# Patient Record
Sex: MALE | Race: WHITE | NOT HISPANIC OR LATINO | Employment: FULL TIME | URBAN - METROPOLITAN AREA
[De-identification: names, ages, dates, MRNs, and addresses within clinical notes are randomized per-mention and may not be internally consistent; named-entity substitution may affect disease eponyms.]

---

## 2018-10-07 DIAGNOSIS — M16.10 ARTHRITIS, HIP: Primary | ICD-10-CM

## 2018-10-07 RX ORDER — DICLOFENAC SODIUM 75 MG/1
75 TABLET, DELAYED RELEASE ORAL 2 TIMES DAILY PRN
Qty: 60 TABLET | Refills: 2 | Status: SHIPPED | OUTPATIENT
Start: 2018-10-07 | End: 2019-01-06

## 2019-01-06 ENCOUNTER — OFFICE VISIT (OUTPATIENT)
Dept: URGENT CARE | Facility: CLINIC | Age: 59
End: 2019-01-06
Payer: COMMERCIAL

## 2019-01-06 VITALS
OXYGEN SATURATION: 96 % | HEIGHT: 69 IN | WEIGHT: 225 LBS | BODY MASS INDEX: 33.33 KG/M2 | RESPIRATION RATE: 18 BRPM | HEART RATE: 82 BPM | SYSTOLIC BLOOD PRESSURE: 140 MMHG | DIASTOLIC BLOOD PRESSURE: 84 MMHG | TEMPERATURE: 99 F

## 2019-01-06 DIAGNOSIS — J06.9 URI, ACUTE: Primary | ICD-10-CM

## 2019-01-06 PROCEDURE — 96372 THER/PROPH/DIAG INJ SC/IM: CPT | Mod: S$GLB,,, | Performed by: EMERGENCY MEDICINE

## 2019-01-06 PROCEDURE — 99203 OFFICE O/P NEW LOW 30 MIN: CPT | Mod: 25,S$GLB,, | Performed by: PHYSICIAN ASSISTANT

## 2019-01-06 PROCEDURE — 96372 PR INJECTION,THERAP/PROPH/DIAG2ST, IM OR SUBCUT: ICD-10-PCS | Mod: S$GLB,,, | Performed by: EMERGENCY MEDICINE

## 2019-01-06 PROCEDURE — 3008F BODY MASS INDEX DOCD: CPT | Mod: CPTII,S$GLB,, | Performed by: PHYSICIAN ASSISTANT

## 2019-01-06 PROCEDURE — 3008F PR BODY MASS INDEX (BMI) DOCUMENTED: ICD-10-PCS | Mod: CPTII,S$GLB,, | Performed by: PHYSICIAN ASSISTANT

## 2019-01-06 PROCEDURE — 99203 PR OFFICE/OUTPT VISIT, NEW, LEVL III, 30-44 MIN: ICD-10-PCS | Mod: 25,S$GLB,, | Performed by: PHYSICIAN ASSISTANT

## 2019-01-06 RX ORDER — PROMETHAZINE HYDROCHLORIDE AND CODEINE PHOSPHATE 6.25; 1 MG/5ML; MG/5ML
5 SOLUTION ORAL EVERY 4 HOURS PRN
Qty: 118 ML | Refills: 0 | Status: SHIPPED | OUTPATIENT
Start: 2019-01-06 | End: 2023-09-11

## 2019-01-06 RX ORDER — PSEUDOEPHEDRINE HCL 120 MG/1
120 TABLET, FILM COATED, EXTENDED RELEASE ORAL 2 TIMES DAILY PRN
Qty: 20 TABLET | Refills: 0 | Status: SHIPPED | OUTPATIENT
Start: 2019-01-06 | End: 2019-01-16

## 2019-01-06 RX ORDER — BETAMETHASONE SODIUM PHOSPHATE AND BETAMETHASONE ACETATE 3; 3 MG/ML; MG/ML
9 INJECTION, SUSPENSION INTRA-ARTICULAR; INTRALESIONAL; INTRAMUSCULAR; SOFT TISSUE
Status: COMPLETED | OUTPATIENT
Start: 2019-01-06 | End: 2019-01-06

## 2019-01-06 RX ORDER — DICLOFENAC SODIUM 75 MG/1
75 TABLET, DELAYED RELEASE ORAL 2 TIMES DAILY
COMMUNITY

## 2019-01-06 RX ORDER — PROMETHAZINE HYDROCHLORIDE AND CODEINE PHOSPHATE 6.25; 1 MG/5ML; MG/5ML
5 SOLUTION ORAL EVERY 4 HOURS PRN
Qty: 118 ML | Refills: 0 | Status: SHIPPED | OUTPATIENT
Start: 2019-01-06 | End: 2019-01-06 | Stop reason: SDUPTHER

## 2019-01-06 RX ADMIN — BETAMETHASONE SODIUM PHOSPHATE AND BETAMETHASONE ACETATE 9 MG: 3; 3 INJECTION, SUSPENSION INTRA-ARTICULAR; INTRALESIONAL; INTRAMUSCULAR; SOFT TISSUE at 12:01

## 2019-01-06 NOTE — PATIENT INSTRUCTIONS
Viral Upper Respiratory Illness (Adult)  You have a viral upper respiratory illness (URI), which is another term for the common cold. This illness is contagious during the first few days. It is spread through the air by coughing and sneezing. It may also be spread by direct contact (touching the sick person and then touching your own eyes, nose, or mouth). Frequent handwashing will decrease risk of spread. Most viral illnesses go away within 7 to 10 days with rest and simple home remedies. Sometimes the illness may last for several weeks. Antibiotics will not kill a virus, and they are generally not prescribed for this condition.    Home care  · If symptoms are severe, rest at home for the first 2 to 3 days. When you resume activity, don't let yourself get too tired.  · Avoid being exposed to cigarette smoke (yours or others).  · You may use acetaminophen or ibuprofen to control pain and fever, unless another medicine was prescribed. (Note: If you have chronic liver or kidney disease, have ever had a stomach ulcer or gastrointestinal bleeding, or are taking blood-thinning medicines, talk with your healthcare provider before using these medicines.) Aspirin should never be given to anyone under 18 years of age who is ill with a viral infection or fever. It may cause severe liver or brain damage.  · Your appetite may be poor, so a light diet is fine. Avoid dehydration by drinking 6 to 8 glasses of fluids per day (water, soft drinks, juices, tea, or soup). Extra fluids will help loosen secretions in the nose and lungs.  · Over-the-counter cold medicines will not shorten the length of time youre sick, but they may be helpful for the following symptoms: cough, sore throat, and nasal and sinus congestion. (Note: Do not use decongestants if you have high blood pressure.)  Follow-up care  Follow up with your healthcare provider, or as advised.  When to seek medical advice  Call your healthcare provider right away if any  of these occur:  · Cough with lots of colored sputum (mucus)  · Severe headache; face, neck, or ear pain  · Difficulty swallowing due to throat pain  · Fever of 100.4°F (38°C)  Call 911, or get immediate medical care  Call emergency services right away if any of these occur:  · Chest pain, shortness of breath, wheezing, or difficulty breathing  · Coughing up blood  · Inability to swallow due to throat pain  Date Last Reviewed: 9/13/2015  © 2597-8051 [employer]. [address] [place] [address]. All rights reserved. This information is not intended as a substitute for professional medical care. Always follow your healthcare professional's instructions.

## 2019-01-06 NOTE — PROGRESS NOTES
Subjective:       Patient ID: Fabio Bhardwaj is a 58 y.o. male.    Chief Complaint: Sinus Problem    HPI  Review of Systems    Objective:      Physical Exam    Assessment:       No diagnosis found.    Plan:       ***

## 2019-01-06 NOTE — PROGRESS NOTES
"Subjective:       Patient ID: Fabio Bhardwaj is a 58 y.o. male.    Vitals:  height is 5' 8.5" (1.74 m) and weight is 102.1 kg (225 lb). His tympanic temperature is 98.7 °F (37.1 °C). His blood pressure is 140/84 (abnormal) and his pulse is 82. His respiration is 18 and oxygen saturation is 96%.     Chief Complaint: Sinus Problem    This is a 58 y.o. male who presents today with a chief complaint of   Sinus congestion, cough with yellow mucus, low fever, sinus pressure and some slight ear pain.  Patient states his wife was recently diagnosed with croup.      Sinus Problem   This is a new problem. The current episode started in the past 7 days. The problem has been gradually worsening since onset. The maximum temperature recorded prior to his arrival was 100.4 - 100.9 F. The fever has been present for less than 1 day. He is experiencing no pain. Associated symptoms include congestion, coughing, ear pain, sinus pressure and a sore throat. Pertinent negatives include no chills, diaphoresis, headaches or shortness of breath. Treatments tried: nyquil, gargles. The treatment provided mild relief.       Constitution: Positive for fatigue and fever. Negative for chills and sweating.   HENT: Positive for ear pain, congestion, sinus pressure and sore throat. Negative for sinus pain and voice change.    Neck: Negative for painful lymph nodes.   Cardiovascular: Negative for chest pain and palpitations.   Eyes: Negative for eye redness.   Respiratory: Positive for cough, sputum production and wheezing. Negative for chest tightness, bloody sputum, COPD, shortness of breath, stridor and asthma.    Gastrointestinal: Negative for abdominal pain, nausea, vomiting and diarrhea.   Musculoskeletal: Negative for pain, trauma, joint pain and muscle ache.   Skin: Negative for color change and rash.   Allergic/Immunologic: Negative for seasonal allergies and asthma.   Neurological: Negative for dizziness and headaches. "   Hematologic/Lymphatic: Negative for swollen lymph nodes.       Objective:      Physical Exam   Constitutional: He is oriented to person, place, and time. He appears well-developed and well-nourished. He is cooperative.  Non-toxic appearance. He does not appear ill. No distress.   HENT:   Head: Normocephalic and atraumatic.   Right Ear: Hearing, tympanic membrane, external ear and ear canal normal.   Left Ear: Hearing, tympanic membrane, external ear and ear canal normal.   Nose: Mucosal edema present. No rhinorrhea or nasal deformity. No epistaxis. Right sinus exhibits no maxillary sinus tenderness and no frontal sinus tenderness. Left sinus exhibits no maxillary sinus tenderness and no frontal sinus tenderness.   Mouth/Throat: Uvula is midline, oropharynx is clear and moist and mucous membranes are normal. No trismus in the jaw. Normal dentition. No uvula swelling. No posterior oropharyngeal erythema.   Eyes: Conjunctivae and lids are normal. Right eye exhibits no discharge. Left eye exhibits no discharge. No scleral icterus.   Sclera clear bilat   Neck: Trachea normal, normal range of motion, full passive range of motion without pain and phonation normal. Neck supple.   Cardiovascular: Normal rate, regular rhythm, normal heart sounds, intact distal pulses and normal pulses.   Pulmonary/Chest: Effort normal and breath sounds normal. No respiratory distress.   Abdominal: Soft. Normal appearance and bowel sounds are normal. He exhibits no distension, no pulsatile midline mass and no mass. There is no tenderness.   Musculoskeletal: Normal range of motion. He exhibits no edema or deformity.   Neurological: He is alert and oriented to person, place, and time. He exhibits normal muscle tone. Coordination normal.   Skin: Skin is warm, dry and intact. He is not diaphoretic. No pallor.   Psychiatric: He has a normal mood and affect. His speech is normal and behavior is normal. Judgment and thought content normal.  Cognition and memory are normal.   Nursing note and vitals reviewed.      Assessment:       1. URI, acute        Plan:         URI, acute  -     betamethasone acetate-betamethasone sodium phosphate injection 9 mg  -     pseudoephedrine (SUDAFED) 120 mg 12 hr tablet; Take 1 tablet (120 mg total) by mouth 2 (two) times daily as needed for Congestion.  Dispense: 20 tablet; Refill: 0  -     promethazine-codeine 6.25-10 mg/5 ml (PHENERGAN WITH CODEINE) 6.25-10 mg/5 mL syrup; Take 5 mLs by mouth every 4 (four) hours as needed for Cough.  Dispense: 118 mL; Refill: 0      Patient Instructions     Viral Upper Respiratory Illness (Adult)  You have a viral upper respiratory illness (URI), which is another term for the common cold. This illness is contagious during the first few days. It is spread through the air by coughing and sneezing. It may also be spread by direct contact (touching the sick person and then touching your own eyes, nose, or mouth). Frequent handwashing will decrease risk of spread. Most viral illnesses go away within 7 to 10 days with rest and simple home remedies. Sometimes the illness may last for several weeks. Antibiotics will not kill a virus, and they are generally not prescribed for this condition.    Home care  · If symptoms are severe, rest at home for the first 2 to 3 days. When you resume activity, don't let yourself get too tired.  · Avoid being exposed to cigarette smoke (yours or others).  · You may use acetaminophen or ibuprofen to control pain and fever, unless another medicine was prescribed. (Note: If you have chronic liver or kidney disease, have ever had a stomach ulcer or gastrointestinal bleeding, or are taking blood-thinning medicines, talk with your healthcare provider before using these medicines.) Aspirin should never be given to anyone under 18 years of age who is ill with a viral infection or fever. It may cause severe liver or brain damage.  · Your appetite may be poor, so a  light diet is fine. Avoid dehydration by drinking 6 to 8 glasses of fluids per day (water, soft drinks, juices, tea, or soup). Extra fluids will help loosen secretions in the nose and lungs.  · Over-the-counter cold medicines will not shorten the length of time youre sick, but they may be helpful for the following symptoms: cough, sore throat, and nasal and sinus congestion. (Note: Do not use decongestants if you have high blood pressure.)  Follow-up care  Follow up with your healthcare provider, or as advised.  When to seek medical advice  Call your healthcare provider right away if any of these occur:  · Cough with lots of colored sputum (mucus)  · Severe headache; face, neck, or ear pain  · Difficulty swallowing due to throat pain  · Fever of 100.4°F (38°C)  Call 911, or get immediate medical care  Call emergency services right away if any of these occur:  · Chest pain, shortness of breath, wheezing, or difficulty breathing  · Coughing up blood  · Inability to swallow due to throat pain  Date Last Reviewed: 9/13/2015  © 7467-6174 [employer]. [address] [place] [address]. All rights reserved. This information is not intended as a substitute for professional medical care. Always follow your healthcare professional's instructions.

## 2021-03-08 ENCOUNTER — OFFICE VISIT (OUTPATIENT)
Dept: URGENT CARE | Facility: CLINIC | Age: 61
End: 2021-03-08
Payer: COMMERCIAL

## 2021-03-08 VITALS
WEIGHT: 240 LBS | SYSTOLIC BLOOD PRESSURE: 167 MMHG | OXYGEN SATURATION: 98 % | HEART RATE: 72 BPM | DIASTOLIC BLOOD PRESSURE: 91 MMHG | TEMPERATURE: 97 F | HEIGHT: 69 IN | BODY MASS INDEX: 35.55 KG/M2

## 2021-03-08 DIAGNOSIS — G47.30 SLEEP APNEA, UNSPECIFIED TYPE: ICD-10-CM

## 2021-03-08 DIAGNOSIS — R06.02 SOB (SHORTNESS OF BREATH): Primary | ICD-10-CM

## 2021-03-08 DIAGNOSIS — I51.7 CARDIOMEGALY: ICD-10-CM

## 2021-03-08 LAB
CTP QC/QA: YES
SARS-COV-2 RDRP RESP QL NAA+PROBE: NEGATIVE

## 2021-03-08 PROCEDURE — 3008F BODY MASS INDEX DOCD: CPT | Mod: CPTII,S$GLB,, | Performed by: PHYSICIAN ASSISTANT

## 2021-03-08 PROCEDURE — U0002: ICD-10-PCS | Mod: QW,S$GLB,, | Performed by: PHYSICIAN ASSISTANT

## 2021-03-08 PROCEDURE — 99214 PR OFFICE/OUTPT VISIT, EST, LEVL IV, 30-39 MIN: ICD-10-PCS | Mod: S$GLB,,, | Performed by: PHYSICIAN ASSISTANT

## 2021-03-08 PROCEDURE — 93005 EKG 12-LEAD: ICD-10-PCS | Mod: S$GLB,,, | Performed by: PHYSICIAN ASSISTANT

## 2021-03-08 PROCEDURE — 3008F PR BODY MASS INDEX (BMI) DOCUMENTED: ICD-10-PCS | Mod: CPTII,S$GLB,, | Performed by: PHYSICIAN ASSISTANT

## 2021-03-08 PROCEDURE — 93010 ELECTROCARDIOGRAM REPORT: CPT | Mod: S$GLB,,, | Performed by: INTERNAL MEDICINE

## 2021-03-08 PROCEDURE — 71046 XR CHEST PA AND LATERAL: ICD-10-PCS | Mod: S$GLB,,, | Performed by: RADIOLOGY

## 2021-03-08 PROCEDURE — 93005 ELECTROCARDIOGRAM TRACING: CPT | Mod: S$GLB,,, | Performed by: PHYSICIAN ASSISTANT

## 2021-03-08 PROCEDURE — 71046 X-RAY EXAM CHEST 2 VIEWS: CPT | Mod: S$GLB,,, | Performed by: RADIOLOGY

## 2021-03-08 PROCEDURE — U0002 COVID-19 LAB TEST NON-CDC: HCPCS | Mod: QW,S$GLB,, | Performed by: PHYSICIAN ASSISTANT

## 2021-03-08 PROCEDURE — 99214 OFFICE O/P EST MOD 30 MIN: CPT | Mod: S$GLB,,, | Performed by: PHYSICIAN ASSISTANT

## 2021-03-08 PROCEDURE — 93010 EKG 12-LEAD: ICD-10-PCS | Mod: S$GLB,,, | Performed by: INTERNAL MEDICINE

## 2021-03-08 RX ORDER — AZELASTINE 1 MG/ML
1 SPRAY, METERED NASAL 2 TIMES DAILY
Qty: 30 ML | Refills: 0 | Status: SHIPPED | OUTPATIENT
Start: 2021-03-08 | End: 2022-03-08

## 2021-03-09 ENCOUNTER — TELEPHONE (OUTPATIENT)
Dept: URGENT CARE | Facility: CLINIC | Age: 61
End: 2021-03-09

## 2021-03-09 ENCOUNTER — TELEPHONE (OUTPATIENT)
Dept: CARDIOLOGY | Facility: CLINIC | Age: 61
End: 2021-03-09

## 2021-03-09 ENCOUNTER — TELEPHONE (OUTPATIENT)
Dept: ADMINISTRATIVE | Facility: OTHER | Age: 61
End: 2021-03-09

## 2021-03-10 ENCOUNTER — OFFICE VISIT (OUTPATIENT)
Dept: CARDIOLOGY | Facility: CLINIC | Age: 61
End: 2021-03-10
Payer: COMMERCIAL

## 2021-03-10 VITALS
SYSTOLIC BLOOD PRESSURE: 159 MMHG | HEART RATE: 60 BPM | BODY MASS INDEX: 35.15 KG/M2 | DIASTOLIC BLOOD PRESSURE: 78 MMHG | OXYGEN SATURATION: 97 % | HEIGHT: 69 IN | WEIGHT: 237.31 LBS

## 2021-03-10 DIAGNOSIS — I10 ESSENTIAL HYPERTENSION: ICD-10-CM

## 2021-03-10 DIAGNOSIS — R94.31 ABNORMAL ELECTROCARDIOGRAM (ECG) (EKG): ICD-10-CM

## 2021-03-10 DIAGNOSIS — R06.02 SOB (SHORTNESS OF BREATH): Primary | ICD-10-CM

## 2021-03-10 DIAGNOSIS — G47.30 SLEEP APNEA IN ADULT: ICD-10-CM

## 2021-03-10 PROCEDURE — 3078F PR MOST RECENT DIASTOLIC BLOOD PRESSURE < 80 MM HG: ICD-10-PCS | Mod: CPTII,S$GLB,, | Performed by: INTERNAL MEDICINE

## 2021-03-10 PROCEDURE — 3008F BODY MASS INDEX DOCD: CPT | Mod: CPTII,S$GLB,, | Performed by: INTERNAL MEDICINE

## 2021-03-10 PROCEDURE — 99999 PR PBB SHADOW E&M-EST. PATIENT-LVL III: CPT | Mod: PBBFAC,,, | Performed by: INTERNAL MEDICINE

## 2021-03-10 PROCEDURE — 3008F PR BODY MASS INDEX (BMI) DOCUMENTED: ICD-10-PCS | Mod: CPTII,S$GLB,, | Performed by: INTERNAL MEDICINE

## 2021-03-10 PROCEDURE — 99203 PR OFFICE/OUTPT VISIT, NEW, LEVL III, 30-44 MIN: ICD-10-PCS | Mod: S$GLB,,, | Performed by: INTERNAL MEDICINE

## 2021-03-10 PROCEDURE — 93000 EKG 12-LEAD: ICD-10-PCS | Mod: S$GLB,,, | Performed by: INTERNAL MEDICINE

## 2021-03-10 PROCEDURE — 1126F AMNT PAIN NOTED NONE PRSNT: CPT | Mod: S$GLB,,, | Performed by: INTERNAL MEDICINE

## 2021-03-10 PROCEDURE — 3077F PR MOST RECENT SYSTOLIC BLOOD PRESSURE >= 140 MM HG: ICD-10-PCS | Mod: CPTII,S$GLB,, | Performed by: INTERNAL MEDICINE

## 2021-03-10 PROCEDURE — 99999 PR PBB SHADOW E&M-EST. PATIENT-LVL III: ICD-10-PCS | Mod: PBBFAC,,, | Performed by: INTERNAL MEDICINE

## 2021-03-10 PROCEDURE — 1126F PR PAIN SEVERITY QUANTIFIED, NO PAIN PRESENT: ICD-10-PCS | Mod: S$GLB,,, | Performed by: INTERNAL MEDICINE

## 2021-03-10 PROCEDURE — 3078F DIAST BP <80 MM HG: CPT | Mod: CPTII,S$GLB,, | Performed by: INTERNAL MEDICINE

## 2021-03-10 PROCEDURE — 99203 OFFICE O/P NEW LOW 30 MIN: CPT | Mod: S$GLB,,, | Performed by: INTERNAL MEDICINE

## 2021-03-10 PROCEDURE — 93000 ELECTROCARDIOGRAM COMPLETE: CPT | Mod: S$GLB,,, | Performed by: INTERNAL MEDICINE

## 2021-03-10 PROCEDURE — 3077F SYST BP >= 140 MM HG: CPT | Mod: CPTII,S$GLB,, | Performed by: INTERNAL MEDICINE

## 2021-03-10 RX ORDER — AMLODIPINE BESYLATE 5 MG/1
5 TABLET ORAL DAILY
Qty: 5 TABLET | Refills: 4 | Status: SHIPPED | OUTPATIENT
Start: 2021-03-10 | End: 2021-05-18

## 2021-03-11 DIAGNOSIS — I10 ESSENTIAL HYPERTENSION: Primary | ICD-10-CM

## 2021-03-11 PROBLEM — G47.30 SLEEP APNEA IN ADULT: Status: ACTIVE | Noted: 2021-03-11

## 2021-03-11 PROBLEM — R94.31 ABNORMAL ELECTROCARDIOGRAM (ECG) (EKG): Status: ACTIVE | Noted: 2021-03-11

## 2021-03-11 PROBLEM — R06.02 SOB (SHORTNESS OF BREATH): Status: ACTIVE | Noted: 2021-03-11

## 2021-03-11 RX ORDER — AMLODIPINE BESYLATE 5 MG/1
5 TABLET ORAL DAILY
Qty: 90 TABLET | Refills: 4 | Status: SHIPPED | OUTPATIENT
Start: 2021-03-11 | End: 2021-05-18

## 2021-03-18 ENCOUNTER — HOSPITAL ENCOUNTER (OUTPATIENT)
Dept: CARDIOLOGY | Facility: HOSPITAL | Age: 61
Discharge: HOME | End: 2021-03-18
Attending: INTERNAL MEDICINE
Payer: COMMERCIAL

## 2021-03-18 VITALS — HEIGHT: 69 IN | BODY MASS INDEX: 35.1 KG/M2 | WEIGHT: 237 LBS

## 2021-03-18 DIAGNOSIS — R06.02 SOB (SHORTNESS OF BREATH): ICD-10-CM

## 2021-03-18 LAB
AORTIC ROOT ANNULUS: 3.55 CM
AV INDEX (PROSTH): 0.68
AV MEAN GRADIENT: 6 MMHG
AV PEAK GRADIENT: 10 MMHG
AV VALVE AREA: 3.88 CM2
AV VELOCITY RATIO: 0.79
BSA FOR ECHO PROCEDURE: 2.29 M2
CV ECHO LV RWT: 0.69 CM
DOP CALC AO PEAK VEL: 1.55 M/S
DOP CALC AO VTI: 33.37 CM
DOP CALC LVOT AREA: 5.7 CM2
DOP CALC LVOT DIAMETER: 2.7 CM
DOP CALC LVOT PEAK VEL: 1.22 M/S
DOP CALC LVOT STROKE VOLUME: 129.5 CM3
DOP CALCLVOT PEAK VEL VTI: 22.63 CM
E WAVE DECELERATION TIME: 177.91 MSEC
E/A RATIO: 0.82
E/E' RATIO: 7.33 M/S
ECHO LV POSTERIOR WALL: 1.1 CM (ref 0.6–1.1)
FRACTIONAL SHORTENING: 0 % (ref 28–44)
INTERVENTRICULAR SEPTUM: 2.2 CM (ref 0.6–1.1)
LA MAJOR: 5.68 CM
LA MINOR: 5.8 CM
LA WIDTH: 6.38 CM
LEFT ATRIUM SIZE: 4.77 CM
LEFT ATRIUM VOLUME INDEX MOD: 43.2 ML/M2
LEFT ATRIUM VOLUME INDEX: 66.9 ML/M2
LEFT ATRIUM VOLUME MOD: 95.97 CM3
LEFT ATRIUM VOLUME: 148.46 CM3
LEFT INTERNAL DIMENSION IN SYSTOLE: 3.21 CM (ref 2.1–4)
LEFT VENTRICLE DIASTOLIC VOLUME INDEX: 36.81 ML/M2
LEFT VENTRICLE DIASTOLIC VOLUME: 81.72 ML
LEFT VENTRICLE MASS INDEX: 91 G/M2
LEFT VENTRICLE SYSTOLIC VOLUME INDEX: 18.6 ML/M2
LEFT VENTRICLE SYSTOLIC VOLUME: 41.37 ML
LEFT VENTRICULAR INTERNAL DIMENSION IN DIASTOLE: 3.2 CM (ref 3.5–6)
LEFT VENTRICULAR MASS: 201.83 G
LV LATERAL E/E' RATIO: 5 M/S
LV SEPTAL E/E' RATIO: 13.75 M/S
MV MEAN GRADIENT: 0 MMHG
MV PEAK A VEL: 0.67 M/S
MV PEAK E VEL: 0.55 M/S
MV PEAK GRADIENT: 2 MMHG
MV STENOSIS PRESSURE HALF TIME: 51.59 MS
MV VALVE AREA P 1/2 METHOD: 4.26 CM2
PISA TR MAX VEL: 2.11 M/S
PV PEAK VELOCITY: 1.19 CM/S
RA MAJOR: 4.32 CM
RA PRESSURE: 3 MMHG
RA WIDTH: 4.36 CM
RV TISSUE DOPPLER FREE WALL SYSTOLIC VELOCITY 1 (APICAL 4 CHAMBER VIEW): 16.39 CM/S
STJ: 3.04 CM
TDI LATERAL: 0.11 M/S
TDI SEPTAL: 0.04 M/S
TDI: 0.08 M/S
TR MAX PG: 18 MMHG
TRICUSPID ANNULAR PLANE SYSTOLIC EXCURSION: 3.39 CM
TV REST PULMONARY ARTERY PRESSURE: 21 MMHG

## 2021-03-18 PROCEDURE — 93306 ECHO (CUPID ONLY): ICD-10-PCS | Mod: 26,,, | Performed by: INTERNAL MEDICINE

## 2021-03-18 PROCEDURE — 93306 TTE W/DOPPLER COMPLETE: CPT | Mod: 26,,, | Performed by: INTERNAL MEDICINE

## 2021-03-18 PROCEDURE — 93306 TTE W/DOPPLER COMPLETE: CPT

## 2021-03-29 ENCOUNTER — TELEPHONE (OUTPATIENT)
Dept: CARDIOLOGY | Facility: CLINIC | Age: 61
End: 2021-03-29

## 2021-03-29 DIAGNOSIS — I10 ESSENTIAL HYPERTENSION: Primary | ICD-10-CM

## 2021-03-29 DIAGNOSIS — I42.1 MILD HOCM (HYPERTROPHIC OBSTRUCTIVE CARDIOMYOPATHY): ICD-10-CM

## 2021-04-15 ENCOUNTER — HOSPITAL ENCOUNTER (OUTPATIENT)
Dept: CARDIOLOGY | Facility: HOSPITAL | Age: 61
Discharge: HOME | End: 2021-04-15
Attending: INTERNAL MEDICINE
Payer: COMMERCIAL

## 2021-04-15 VITALS — BODY MASS INDEX: 35.1 KG/M2 | HEIGHT: 69 IN | WEIGHT: 237 LBS

## 2021-04-15 DIAGNOSIS — I42.1 MILD HOCM (HYPERTROPHIC OBSTRUCTIVE CARDIOMYOPATHY): ICD-10-CM

## 2021-04-15 DIAGNOSIS — I10 ESSENTIAL HYPERTENSION: ICD-10-CM

## 2021-04-15 LAB
BSA FOR ECHO PROCEDURE: 2.29 M2
CV STRESS BASE HR: 70 BPM
DIASTOLIC BLOOD PRESSURE: 73 MMHG
EJECTION FRACTION: 60 %
OHS CV CPX 1 MINUTE RECOVERY HEART RATE: 112 BPM
OHS CV CPX 85 PERCENT MAX PREDICTED HEART RATE MALE: 134
OHS CV CPX ESTIMATED METS: 10
OHS CV CPX MAX PREDICTED HEART RATE: 158
OHS CV CPX PATIENT IS FEMALE: 0
OHS CV CPX PATIENT IS MALE: 1
OHS CV CPX PEAK DIASTOLIC BLOOD PRESSURE: 67 MMHG
OHS CV CPX PEAK HEAR RATE: 141 BPM
OHS CV CPX PEAK RATE PRESSURE PRODUCT: NORMAL
OHS CV CPX PEAK SYSTOLIC BLOOD PRESSURE: 205 MMHG
OHS CV CPX PERCENT MAX PREDICTED HEART RATE ACHIEVED: 89
OHS CV CPX RATE PRESSURE PRODUCT PRESENTING: NORMAL
STRESS ANGINA INDEX: 0
STRESS ECHO POST EXERCISE DUR MIN: 9 MINUTES
STRESS ECHO POST EXERCISE DUR SEC: 0 SECONDS
SYSTOLIC BLOOD PRESSURE: 153 MMHG

## 2021-04-15 PROCEDURE — 93351 STRESS ECHO (CUPID ONLY): ICD-10-PCS | Mod: 26,,, | Performed by: INTERNAL MEDICINE

## 2021-04-15 PROCEDURE — 93351 STRESS TTE COMPLETE: CPT | Mod: 26,,, | Performed by: INTERNAL MEDICINE

## 2021-04-15 PROCEDURE — 93351 STRESS TTE COMPLETE: CPT

## 2021-04-15 PROCEDURE — 93227 HOLTER MONITOR - 24 HOUR (CUPID ONLY): ICD-10-PCS | Mod: ,,, | Performed by: INTERNAL MEDICINE

## 2021-04-15 PROCEDURE — 93227 XTRNL ECG REC<48 HR R&I: CPT | Mod: ,,, | Performed by: INTERNAL MEDICINE

## 2021-04-15 PROCEDURE — 93226 XTRNL ECG REC<48 HR SCAN A/R: CPT

## 2021-04-16 LAB
OHS CV EVENT MONITOR DAY: 0
OHS CV HOLTER LENGTH DECIMAL HOURS: 24
OHS CV HOLTER LENGTH HOURS: 24
OHS CV HOLTER LENGTH MINUTES: 0

## 2021-05-18 DIAGNOSIS — I10 ESSENTIAL HYPERTENSION: ICD-10-CM

## 2021-05-18 RX ORDER — VALSARTAN 80 MG/1
80 TABLET ORAL DAILY
Qty: 90 TABLET | Refills: 4 | Status: SHIPPED | OUTPATIENT
Start: 2021-05-18 | End: 2022-07-26

## 2021-05-18 RX ORDER — AMLODIPINE BESYLATE 10 MG/1
10 TABLET ORAL DAILY
Qty: 90 TABLET | Refills: 4 | Status: SHIPPED | OUTPATIENT
Start: 2021-05-18 | End: 2022-07-26

## 2021-05-18 RX ORDER — AMLODIPINE BESYLATE 10 MG/1
10 TABLET ORAL DAILY
Qty: 90 TABLET | Refills: 4 | Status: SHIPPED | OUTPATIENT
Start: 2021-05-18 | End: 2021-05-18 | Stop reason: SDUPTHER

## 2021-05-18 RX ORDER — VALSARTAN 80 MG/1
80 TABLET ORAL DAILY
Qty: 90 TABLET | Refills: 4 | Status: SHIPPED | OUTPATIENT
Start: 2021-05-18 | End: 2021-05-18 | Stop reason: SDUPTHER

## 2022-01-28 ENCOUNTER — LAB VISIT (OUTPATIENT)
Dept: LAB | Facility: HOSPITAL | Age: 62
End: 2022-01-28
Payer: COMMERCIAL

## 2022-01-28 ENCOUNTER — TELEPHONE (OUTPATIENT)
Dept: INTERNAL MEDICINE | Facility: CLINIC | Age: 62
End: 2022-01-28
Payer: COMMERCIAL

## 2022-01-28 DIAGNOSIS — R09.1 PLEURISY: Primary | ICD-10-CM

## 2022-01-28 DIAGNOSIS — R09.1 PLEURISY: ICD-10-CM

## 2022-01-28 LAB
BASOPHILS # BLD AUTO: 0.03 K/UL (ref 0–0.2)
BASOPHILS NFR BLD: 0.3 % (ref 0–1.9)
D DIMER PPP IA.FEU-MCNC: 0.99 MG/L FEU
DIFFERENTIAL METHOD: ABNORMAL
EOSINOPHIL # BLD AUTO: 0.1 K/UL (ref 0–0.5)
EOSINOPHIL NFR BLD: 0.9 % (ref 0–8)
ERYTHROCYTE [DISTWIDTH] IN BLOOD BY AUTOMATED COUNT: 13.1 % (ref 11.5–14.5)
ERYTHROCYTE [SEDIMENTATION RATE] IN BLOOD BY WESTERGREN METHOD: 15 MM/HR (ref 0–23)
HCT VFR BLD AUTO: 47.8 % (ref 40–54)
HGB BLD-MCNC: 15.1 G/DL (ref 14–18)
IMM GRANULOCYTES # BLD AUTO: 0.03 K/UL (ref 0–0.04)
IMM GRANULOCYTES NFR BLD AUTO: 0.3 % (ref 0–0.5)
LYMPHOCYTES # BLD AUTO: 2.5 K/UL (ref 1–4.8)
LYMPHOCYTES NFR BLD: 27 % (ref 18–48)
MCH RBC QN AUTO: 28.8 PG (ref 27–31)
MCHC RBC AUTO-ENTMCNC: 31.6 G/DL (ref 32–36)
MCV RBC AUTO: 91 FL (ref 82–98)
MONOCYTES # BLD AUTO: 0.6 K/UL (ref 0.3–1)
MONOCYTES NFR BLD: 6.9 % (ref 4–15)
NEUTROPHILS # BLD AUTO: 5.9 K/UL (ref 1.8–7.7)
NEUTROPHILS NFR BLD: 64.6 % (ref 38–73)
NRBC BLD-RTO: 0 /100 WBC
PLATELET # BLD AUTO: 235 K/UL (ref 150–450)
PMV BLD AUTO: 10.7 FL (ref 9.2–12.9)
RBC # BLD AUTO: 5.24 M/UL (ref 4.6–6.2)
TROPONIN I SERPL DL<=0.01 NG/ML-MCNC: 0.09 NG/ML (ref 0–0.03)
WBC # BLD AUTO: 9.11 K/UL (ref 3.9–12.7)

## 2022-01-28 PROCEDURE — 85025 COMPLETE CBC W/AUTO DIFF WBC: CPT | Performed by: INTERNAL MEDICINE

## 2022-01-28 PROCEDURE — 36415 COLL VENOUS BLD VENIPUNCTURE: CPT | Performed by: INTERNAL MEDICINE

## 2022-01-28 PROCEDURE — 84484 ASSAY OF TROPONIN QUANT: CPT | Performed by: INTERNAL MEDICINE

## 2022-01-28 PROCEDURE — 85379 FIBRIN DEGRADATION QUANT: CPT | Performed by: INTERNAL MEDICINE

## 2022-01-28 PROCEDURE — 85652 RBC SED RATE AUTOMATED: CPT | Performed by: INTERNAL MEDICINE

## 2022-01-28 NOTE — TELEPHONE ENCOUNTER
Dr isabel called me with orders , entered and patient going this evening at 530 at primary care location.    fyi

## 2022-01-29 ENCOUNTER — PATIENT MESSAGE (OUTPATIENT)
Dept: INTERNAL MEDICINE | Facility: CLINIC | Age: 62
End: 2022-01-29
Payer: COMMERCIAL

## 2022-01-29 ENCOUNTER — TELEPHONE (OUTPATIENT)
Dept: INTERNAL MEDICINE | Facility: CLINIC | Age: 62
End: 2022-01-29
Payer: COMMERCIAL

## 2022-01-29 ENCOUNTER — HOSPITAL ENCOUNTER (OUTPATIENT)
Facility: HOSPITAL | Age: 62
Discharge: LEFT BEFORE DISCHARGE | End: 2022-01-30
Attending: EMERGENCY MEDICINE | Admitting: EMERGENCY MEDICINE
Payer: COMMERCIAL

## 2022-01-29 VITALS
DIASTOLIC BLOOD PRESSURE: 65 MMHG | WEIGHT: 225 LBS | RESPIRATION RATE: 18 BRPM | TEMPERATURE: 99 F | HEIGHT: 69 IN | SYSTOLIC BLOOD PRESSURE: 138 MMHG | OXYGEN SATURATION: 93 % | HEART RATE: 77 BPM | BODY MASS INDEX: 33.33 KG/M2

## 2022-01-29 DIAGNOSIS — U07.1 COVID-19 VIRUS INFECTION: ICD-10-CM

## 2022-01-29 DIAGNOSIS — R09.1 PLEURISY: ICD-10-CM

## 2022-01-29 DIAGNOSIS — Z86.79 H/O HYPERTROPHIC CARDIOMYOPATHY: ICD-10-CM

## 2022-01-29 DIAGNOSIS — R89.9 ABNORMAL LABORATORY TEST: ICD-10-CM

## 2022-01-29 DIAGNOSIS — R07.89 OTHER CHEST PAIN: Primary | ICD-10-CM

## 2022-01-29 DIAGNOSIS — I10 ESSENTIAL HYPERTENSION: Primary | ICD-10-CM

## 2022-01-29 PROBLEM — R79.89 TROPONIN LEVEL ELEVATED: Status: ACTIVE | Noted: 2022-01-29

## 2022-01-29 PROBLEM — I21.4 NSTEMI (NON-ST ELEVATED MYOCARDIAL INFARCTION): Status: ACTIVE | Noted: 2022-01-29

## 2022-01-29 LAB
ALBUMIN SERPL BCP-MCNC: 3.4 G/DL (ref 3.5–5.2)
ALP SERPL-CCNC: 49 U/L (ref 55–135)
ALT SERPL W/O P-5'-P-CCNC: 22 U/L (ref 10–44)
ANION GAP SERPL CALC-SCNC: 11 MMOL/L (ref 8–16)
AST SERPL-CCNC: 19 U/L (ref 10–40)
BASOPHILS # BLD AUTO: 0.01 K/UL (ref 0–0.2)
BASOPHILS NFR BLD: 0.1 % (ref 0–1.9)
BILIRUB SERPL-MCNC: 0.8 MG/DL (ref 0.1–1)
BNP SERPL-MCNC: 72 PG/ML (ref 0–99)
BUN SERPL-MCNC: 18 MG/DL (ref 8–23)
CALCIUM SERPL-MCNC: 9.7 MG/DL (ref 8.7–10.5)
CHLORIDE SERPL-SCNC: 103 MMOL/L (ref 95–110)
CO2 SERPL-SCNC: 25 MMOL/L (ref 23–29)
CREAT SERPL-MCNC: 1 MG/DL (ref 0.5–1.4)
CTP QC/QA: YES
DIFFERENTIAL METHOD: NORMAL
EOSINOPHIL # BLD AUTO: 0.1 K/UL (ref 0–0.5)
EOSINOPHIL NFR BLD: 1.4 % (ref 0–8)
ERYTHROCYTE [DISTWIDTH] IN BLOOD BY AUTOMATED COUNT: 13 % (ref 11.5–14.5)
EST. GFR  (AFRICAN AMERICAN): >60 ML/MIN/1.73 M^2
EST. GFR  (NON AFRICAN AMERICAN): >60 ML/MIN/1.73 M^2
GLUCOSE SERPL-MCNC: 99 MG/DL (ref 70–110)
HCT VFR BLD AUTO: 45.2 % (ref 40–54)
HGB BLD-MCNC: 14.6 G/DL (ref 14–18)
IMM GRANULOCYTES # BLD AUTO: 0.02 K/UL (ref 0–0.04)
IMM GRANULOCYTES NFR BLD AUTO: 0.3 % (ref 0–0.5)
LYMPHOCYTES # BLD AUTO: 2.3 K/UL (ref 1–4.8)
LYMPHOCYTES NFR BLD: 30.5 % (ref 18–48)
MCH RBC QN AUTO: 29.6 PG (ref 27–31)
MCHC RBC AUTO-ENTMCNC: 32.3 G/DL (ref 32–36)
MCV RBC AUTO: 92 FL (ref 82–98)
MONOCYTES # BLD AUTO: 0.5 K/UL (ref 0.3–1)
MONOCYTES NFR BLD: 6.3 % (ref 4–15)
NEUTROPHILS # BLD AUTO: 4.7 K/UL (ref 1.8–7.7)
NEUTROPHILS NFR BLD: 61.4 % (ref 38–73)
NRBC BLD-RTO: 0 /100 WBC
PLATELET # BLD AUTO: 213 K/UL (ref 150–450)
PMV BLD AUTO: 10.3 FL (ref 9.2–12.9)
POTASSIUM SERPL-SCNC: 4.1 MMOL/L (ref 3.5–5.1)
PROT SERPL-MCNC: 7.5 G/DL (ref 6–8.4)
RBC # BLD AUTO: 4.94 M/UL (ref 4.6–6.2)
SARS-COV-2 RDRP RESP QL NAA+PROBE: NEGATIVE
SODIUM SERPL-SCNC: 139 MMOL/L (ref 136–145)
TROPONIN I SERPL DL<=0.01 NG/ML-MCNC: 0.07 NG/ML (ref 0–0.03)
WBC # BLD AUTO: 7.68 K/UL (ref 3.9–12.7)

## 2022-01-29 PROCEDURE — 96365 THER/PROPH/DIAG IV INF INIT: CPT | Mod: 59

## 2022-01-29 PROCEDURE — U0002 COVID-19 LAB TEST NON-CDC: HCPCS | Performed by: EMERGENCY MEDICINE

## 2022-01-29 PROCEDURE — 63600175 PHARM REV CODE 636 W HCPCS

## 2022-01-29 PROCEDURE — 93010 EKG 12-LEAD: ICD-10-PCS | Mod: ,,, | Performed by: INTERNAL MEDICINE

## 2022-01-29 PROCEDURE — 99285 EMERGENCY DEPT VISIT HI MDM: CPT | Mod: 25

## 2022-01-29 PROCEDURE — G0378 HOSPITAL OBSERVATION PER HR: HCPCS

## 2022-01-29 PROCEDURE — 84484 ASSAY OF TROPONIN QUANT: CPT | Performed by: EMERGENCY MEDICINE

## 2022-01-29 PROCEDURE — 85025 COMPLETE CBC W/AUTO DIFF WBC: CPT | Performed by: EMERGENCY MEDICINE

## 2022-01-29 PROCEDURE — 25000003 PHARM REV CODE 250

## 2022-01-29 PROCEDURE — 93005 ELECTROCARDIOGRAM TRACING: CPT

## 2022-01-29 PROCEDURE — 99284 PR EMERGENCY DEPT VISIT,LEVEL IV: ICD-10-PCS | Mod: CS,,, | Performed by: EMERGENCY MEDICINE

## 2022-01-29 PROCEDURE — 80053 COMPREHEN METABOLIC PANEL: CPT | Performed by: EMERGENCY MEDICINE

## 2022-01-29 PROCEDURE — 25500020 PHARM REV CODE 255: Performed by: EMERGENCY MEDICINE

## 2022-01-29 PROCEDURE — 99284 EMERGENCY DEPT VISIT MOD MDM: CPT | Mod: CS,,, | Performed by: EMERGENCY MEDICINE

## 2022-01-29 PROCEDURE — 93010 ELECTROCARDIOGRAM REPORT: CPT | Mod: ,,, | Performed by: INTERNAL MEDICINE

## 2022-01-29 PROCEDURE — 83880 ASSAY OF NATRIURETIC PEPTIDE: CPT | Performed by: EMERGENCY MEDICINE

## 2022-01-29 RX ORDER — DOXYCYCLINE HYCLATE 100 MG
100 TABLET ORAL
Status: COMPLETED | OUTPATIENT
Start: 2022-01-29 | End: 2022-01-29

## 2022-01-29 RX ADMIN — DOXYCYCLINE HYCLATE 100 MG: 100 TABLET, COATED ORAL at 10:01

## 2022-01-29 RX ADMIN — CEFTRIAXONE 1 G: 1 INJECTION, SOLUTION INTRAVENOUS at 10:01

## 2022-01-29 RX ADMIN — IOHEXOL 100 ML: 350 INJECTION, SOLUTION INTRAVENOUS at 08:01

## 2022-01-29 NOTE — TELEPHONE ENCOUNTER
61-year-old male patient with a history of COVID approximately 3 weeks ago.  Patient had some chest pain during the onset of the illness which apparently went away until the last few days.  Chest pain is complaining of now is in the right mostly anterior chest with breathing.  He was evaluated recently for shortness of breath by Cardiology and found to have hypertrophic septal cardiomyopathy with some aortic insufficiency.  He has had no acute symptoms of COVID recently along with the chest pain.  He has had no fever or chills.  Pain is worse when a breathes and is not changed with movement of his arms or rotation.  Patient is on amlodipine and valsartan for his cardiomyopathy and his shortness of breath has resolved.    I did not see the patient physically only talk to him on the phone.  I had called him to cancel a golf outing and found out about the symptoms and then ordered a workup.  His sed rate is normal as is CBC that both his troponin and D-dimer mildly elevated.  On that basis I have advised him to go the emergency room for evaluation for pulmonary emboli or a cardiac complication associated with COVID.  At the time of the call he was undecided about whether he was going to be going to [facility] or [facility].    Impression:    1. History of hypertrophic septal cardiomyopathy   2. Mild aortic insufficiency seen on echocardiogram  3. COVID infection dating back almost 3 weeks ago  4. Right-sided pleuritic chest pain with elevated D-dimer  5. Mildly elevated troponin suggestive of a cardiac process that is active and likely related to COVID if it did indeed is the case    Plan 1. I have advised him to go the emergency room today

## 2022-01-29 NOTE — Clinical Note
Diagnosis: Other chest pain [786.59.ICD-9-CM]   Future Attending Provider: PAULA HART [[id number]]   Admitting Provider:: SUSHMA ARTEAGA [[id number]]

## 2022-01-30 PROCEDURE — G0378 HOSPITAL OBSERVATION PER HR: HCPCS

## 2022-01-30 RX ORDER — DOXYCYCLINE 100 MG/1
100 CAPSULE ORAL 2 TIMES DAILY
Qty: 20 CAPSULE | Refills: 0 | Status: SHIPPED | OUTPATIENT
Start: 2022-01-30 | End: 2022-02-04

## 2022-01-30 RX ORDER — CEFPODOXIME PROXETIL 200 MG/1
200 TABLET, FILM COATED ORAL EVERY 12 HOURS
Qty: 10 TABLET | Refills: 0 | Status: SHIPPED | OUTPATIENT
Start: 2022-01-30 | End: 2022-02-04

## 2022-01-30 NOTE — ED PROVIDER NOTES
Encounter Date: 2022       History     Chief Complaint   Patient presents with    Abnormal Lab     D dimer and trop elevated, sent to ed by dr isabel for pe rule out vs covid associated heart issues      Fabio Bhardwaj is a 61-year-old male past medical history of hypertension, hypertrophic cardiomyopathy, GERALD, gout who presents today after abnormal labs finding an elevated troponin and D-dimer.  Patient states that 3 weeks ago he suspects that he had COVID.  He did not have a positive tests, but was around family tested COVID positive and he had classic symptoms.  He recovered without issue.  There is a afternoon he developed pain in his right she just which has been radiating superiorly and medially.  He states the pain is positional and worse with deep breathing.  He has needed to sleep upright in his recliner and states that his heart feels heavy.  He also states that he has been using more chest muscles to breathe.  Patient endorses cough with deep breathing, fatigue, lightheadedness.  He denies fevers, chills, nausea, vomiting, syncope, vision changes, edema.  Patient does not smoke, does not drink alcohol.  He lives at his home with his wife, daughter, and mother.  He has no difficulty ambulating.        Review of patient's allergies indicates:   Allergen Reactions    Amoxicillin-pot clavulanate      Other reaction(s): Nausea    Azithromycin      Past Medical History:   Diagnosis Date    Osteoporosis      Past Surgical History:   Procedure Laterality Date    arthroscopic      EYE SURGERY      JOINT REPLACEMENT       Family History   Problem Relation Age of Onset    Stroke Father     Gout Sister     Gout Maternal Grandfather      Social History     Tobacco Use    Smoking status: Former Smoker     Packs/day: 0.80     Years: 12.00     Pack years: 9.60     Types: Cigarettes     Quit date: 2000     Years since quittin.1   Substance Use Topics    Alcohol use: No    Drug use: No      Review of Systems   Constitutional: Positive for fatigue. Negative for fever.   HENT: Negative for hearing loss, sore throat and trouble swallowing.    Eyes: Negative for visual disturbance.   Respiratory: Positive for cough and shortness of breath.    Cardiovascular: Positive for chest pain.   Gastrointestinal: Negative for abdominal pain, constipation, diarrhea, nausea and vomiting.   Genitourinary: Negative for dysuria.   Musculoskeletal: Negative for back pain.   Skin: Negative for rash.   Neurological: Positive for light-headedness. Negative for weakness.   Hematological: Does not bruise/bleed easily.   Psychiatric/Behavioral: Negative for confusion.       Physical Exam     Initial Vitals [01/29/22 1823]   BP Pulse Resp Temp SpO2   (!) 159/72 94 18 98.6 °F (37 °C) 95 %      MAP       --         Physical Exam    Nursing note and vitals reviewed.  Constitutional: He appears well-developed and well-nourished.   HENT:   Head: Normocephalic and atraumatic.   Eyes: EOM are normal. Pupils are equal, round, and reactive to light. No scleral icterus.   Neck: Neck supple.   Cardiovascular: Normal rate, regular rhythm, normal heart sounds and intact distal pulses.   Pulmonary/Chest: Breath sounds normal. No respiratory distress. He has no wheezes.   Abdominal: Abdomen is soft. Bowel sounds are normal. There is no abdominal tenderness.   Musculoskeletal:         General: Edema (+1 bilateral lower edema) present. No tenderness. Normal range of motion.      Cervical back: Neck supple.     Neurological: He is alert and oriented to person, place, and time. GCS score is 15. GCS eye subscore is 4. GCS verbal subscore is 5. GCS motor subscore is 6.   Skin: Skin is warm and dry.   Psychiatric: He has a normal mood and affect. Thought content normal.         ED Course   Procedures  Labs Reviewed   COMPREHENSIVE METABOLIC PANEL - Abnormal; Notable for the following components:       Result Value    Albumin 3.4 (*)     Alkaline  Phosphatase 49 (*)     All other components within normal limits   TROPONIN I - Abnormal; Notable for the following components:    Troponin I 0.071 (*)     All other components within normal limits   CBC W/ AUTO DIFFERENTIAL   B-TYPE NATRIURETIC PEPTIDE   SARS-COV-2 RDRP GENE    Narrative:     This test utilizes isothermal nucleic acid amplification   technology to detect the SARS-CoV-2 RdRp nucleic acid segment.   The analytical sensitivity (limit of detection) is 125 genome   equivalents/mL.   A POSITIVE result implies infection with the SARS-CoV-2 virus;   the patient is presumed to be contagious.     A NEGATIVE result means that SARS-CoV-2 nucleic acids are not   present above the limit of detection. A NEGATIVE result should be   treated as presumptive. It does not rule out the possibility of   COVID-19 and should not be the sole basis for treatment decisions.   If COVID-19 is strongly suspected based on clinical and exposure   history, re-testing using an alternate molecular assay should be   considered.   This test is only for use under the Food and Drug   Administration s Emergency Use Authorization (EUA).   Commercial kits are provided by [employer].   Performance characteristics of the EUA have been independently   verified by [facility] Department of   Pathology and Laboratory Medicine.   _________________________________________________________________   The authorized Fact Sheet for Healthcare Providers and the authorized Fact   Sheet for Patients of the ID NOW COVID-19 are available on the FDA   website:     https://www.fda.gov/media/587379/download  https://www.fda.gov/media/556776/download              ECG Results          EKG 12-lead (Final result)  Result time 01/30/22 00:53:12    Final result by Interface, Lab In [facility] (01/30/22 00:53:12)                 Narrative:    Test Reason : R89.9,    Vent. Rate : 081 BPM     Atrial Rate : 081 BPM     P-R Int : 214 ms          QRS Dur :  156 ms      QT Int : 418 ms       P-R-T Axes : 054 -45 082 degrees     QTc Int : 485 ms    Sinus rhythm with 1st degree A-V block  Possible Left atrial enlargement  Left axis deviation  LVH with QRS widening and repolarization abnormality  Abnormal ECG  When compared with ECG of 10-MAR-2021 12:16,  T wave inversion less evident in Anterior-lateral leads  QT has lengthened  Confirmed by Gwen DASH, Fabio ([id number]) on 1/30/2022 12:53:00 AM    Referred By: [email or web address]   SELF           Confirmed By:aFbio Hidalgo MD                            Imaging Results           CTA Chest Non-Coronary - PE Study (Final result)  Result time 01/29/22 21:30:17    Final result by Gael Dial MD (01/29/22 21:30:17)                 Impression:      No evidence of pulmonary arterial thromboembolism.    Thickened interventricular septum.  Consider correlation with echocardiography.    Right medial lung base small mild ground-glass and consolidative opacification which may represent infectious or noninfectious inflammatory process.  There is adjacent triangular-shaped and bandlike atelectasis.    Incompletely imaged left renal peripelvic cysts or hydronephrosis not completely evaluated.  Consider correlation with ultrasound.    Two left hailey abdominal mesenteric nodules measuring up to 2.3 cm with surrounding mesenteric haziness and halo of spared fat.  Findings not specific.  Appearance can be seen with sclerosing mesenteritis with abnormal lymph nodes or lymphoma not completely excluded.    This report was flagged in Epic as abnormal.    Electronically signed by resident: Flo Nova  Date:    01/29/2022  Time:    20:51    Electronically signed by: Gael Dial MD  Date:    01/29/2022  Time:    21:30             Narrative:    EXAMINATION:  CTA CHEST NON CORONARY    CLINICAL HISTORY:  Pulmonary embolism (PE) suspected, positive D-dimer;    TECHNIQUE:  Low dose axial images, sagittal and coronal reformations were obtained from the thoracic  inlet to the lung bases following the IV administration of 100 mL of Omnipaque 350.  Contrast timing was optimized to evaluate the pulmonary arteries.  MIPS also obtained.    COMPARISON:  None    FINDINGS:  Pulmonary vasculature: Satisfactory opacification of the pulmonary arterial system with no filling defect to the segmental level.    Aorta: Left-sided aortic arch.  No aneurysm and no significant atherosclerosis    Base of Neck: No significant abnormality.    Thoracic soft tissues: Normal.    Heart: Prominent in size.  Thickened interventricular septum.  No effusion.    Marylou/Mediastinum: No pathologic tamara enlargement.    Airways: Patent.    Lungs/Pleura: Trace left and small right pleural fluid.  Right lower lobe peribronchial thickening.  Right lower lobe triangular-shaped solid opacification with air bronchograms and associated bandlike atelectasis.  Adjacent right medial lung base small mild ground-glass and consolidative opacification.  Left lower lobe bandlike atelectasis subsegmental bandlike atelectasis    Esophagus: Normal.    Upper Abdomen: Limited visualization of the kidneys demonstrates left-sided renal central low attenuating focus measuring 2.8 cm (series 2, image 499) which may represent peripelvic cysts or hydronephrosis incompletely evaluated.  Nonspecific left hailey abdominal mesenteric adjacent nodules measuring 2.3 x 0.9 cm and 1.4 x 0.5 cm with surrounding haziness noting halo of spared fat surrounding nodules.    Bones: No acute fracture. No suspicious lytic or sclerotic lesions.    Miscellaneous: Extensive opacification of the right chest and neck raising question of possible central arterial narrowing such as the right subclavian artery.                                 Medications   iohexoL (OMNIPAQUE 350) injection 100 mL (100 mLs Intravenous Given 1/29/22 2045)   cefTRIAXone (ROCEPHIN) 1 g/50 mL D5W IVPB (0 g Intravenous Stopped 1/29/22 2322)   doxycycline tablet 100 mg (100 mg Oral  "Given 1/29/22 2223)     Medical Decision Making:   Initial Assessment:   61-year-old male with past medical history of hypertrophic cardiomyopathy, hypertension, GERALD, gout presenting 3 weeks after suspected COVID and new onset right chest pain.  He was found to have elevated D-dimer and troponin on outside labs.  Patient describes his pain as position a and primarily on the right side of his chest radiating superiorly and medially.  He states this pain is positional and worse with lying flat.  He does have a cough and pain with deep inspiration.  Patient otherwise appears to be comfortable.  Differential Diagnosis:   Pulmonary embolus  Myocarditis  Pericarditis  Congestive heart failure  Pneumonia  Clinical Tests:   Lab Tests: Ordered  Radiological Study: Ordered and Reviewed  Medical Tests: Ordered and Reviewed  ED Management:  History and physical obtained.  EKG ordered.  Blood work ordered.  CTA pulmonary embolus study ordered.  CBC, CMP, BNP unremarkable.  Troponin down trending.  Patient seen by Cardiology. Per cardiology "61 yr old with HCM basal septal area came today for right sided chest pain. Unlikely ACS, no chest pain currently. Troponin 0.09 -> 0.07. Serial EKG/serial troponin. Monitor overnight. Statin and LDL level. Avoid tachycardia in setting of HCM Consider BB. Avoid inotropic agents. D/W Dr. Hidalgo, will review prior TTE prom last year." Patient seen by the admitting hospitalist team.  The patient feels that he is safe to discharge home with oral antibiotics since his CT scan returned showing pneumonia and no evidence of pulmonary embolism.  Full conversation can be found in Dr. Hendrix's plan of care note.  Patient is choosing to leave AMA.  Risks of leaving AMA explained to patient including permanent cardiac disability, decreased exercise capacity, and death.  Patient understands the risks of leaving AMA.  He was prescribed cefpodoxime and doxycycline to treat his pneumonia.  He is advised to " follow up with his primary care provider in a week.  Patient was discharged home.             ED Course as of 01/30/22 0154   Sun Jan 30, 2022   0035 Patient was admitted, for evaluation elevated troponin, and pneumonia plan for serial troponins.  Was informed by admitting team, patient no longer wishes to stay.  I discussed with patient and resident.  Patient does not believe his troponin is cardiac related.  He feels well.  No active chest pain or shortness of breath and wishes to go home.  I discussed with patient length risks of leaving including delayed diagnosis, cardiac malfunction, permanent disability and death.  Although these risks are low, they are still there.  Will plan discharge with antibiotics, patient will follow-up with PCP and strict return precautions were discussed.  Patient understood agreed with plan.  Patient will be discharged. [SE]      ED Course User Index  [SE] Ирина Parnell MD             Clinical Impression:   Final diagnoses:  [R89.9] Abnormal laboratory test  [R07.89] Other chest pain (Primary)          ED Disposition Condition    SUJATA King MD  Resident  01/30/22 0154

## 2022-01-30 NOTE — ASSESSMENT & PLAN NOTE
61 yr old with HCM basal septal area came today for right sided chest pain. Un;ile;y ACS, no chest pain currently.   Troponin 0.09 -> 0.07.  Serial EKG/serial troponin.  Monitor overnight.  Statin and LDL level.  Avoid tachycardia in setting of HCM Consider BB.  Avoid inotropic agents.  D/W Dr. Hidalgo, will review prior TTE prom last year.  Please call if any questions or concerns or change in status..  Cardiology will follow in am.

## 2022-01-30 NOTE — HPI
61 yr old male with known HCM, HTN, GERALD came today for evaluation of elevated d- dimer, concern with associated rt sided pleuritic chest pain. PCP recommended to come to ED.   Came today for evaluation of PE. CT negative for PE. Troponin mildly elevated. Admitted to . Chest pain right sided with breathing above the RUQ on the lateral aspect. No symptoms with exertion. Walks 2-3 miles daily. Yesterday some SOB after walking 1.5 miles so slowed down. No orthopnea, PND, leg swelling, palpitations, syncope, left sided chest pain/ pressure/ tightness.     BNP 72, Troponin I 0.092 -> 0.07. D-dimer 0.99, Covid negative.  HR 77- 94, .    EKG with NSR, first degree AV block, IVCD, LVH, LAE (similar to prior).    CT: No evidence of pulmonary arterial thromboembolism.Thickened interventricular septum.  Consider correlation with echocardiography. Right medial lung base small mild ground-glass and consolidative opacification which may represent infectious or noninfectious inflammatory process.  There is adjacent triangular-shaped and bandlike atelectasis.    4/15/2021: The stress echo portion of this study is negative for myocardial ischemia.The ECG portion of this study is negative for myocardial ischemia, Left anterior fascicular block prsent. The left ventricle is normal in size with moderate predominantly basal septal moderate asymmetric hypertrophy eccentric hypertrophy and normal systolic function without obstruction with valsalva. The estimated resting ejection fraction is 60%.The test was stopped because the patient experienced fatigue. During stress, the following significant arrhythmias were observed: rare PVCs. Mild aortic regurgitation confirmed by Doppler.    3/2021: Moderate predominantly mid septal asymmetric hypertrophy and normal systolic function. The estimated ejection fraction is 55-60%. Asymetric hypertophic cardiomyopathy. No LVOT obstruction noted however valsalva not performed. Normal right  ventricular size with normal right ventricular systolic function. Severe left atrial enlargement. Indeterminate left ventricular diastolic function. Moderate aortic regurgitation. Normal central venous pressure (3 mmHg). The estimated PA systolic pressure is 21 mmHg.

## 2022-01-30 NOTE — ED NOTES
Assumed care of pt bed 17. Pt sitting up in bed and speaking on phone with son. Spouse at bedside. Pt complaint of chest pain and shortness of breath. Pt reports difficulty breathing when reclined down. Head of bed elevated for pts comfort. SPo2 95 % room air. Chest pain location right side with radiation to right shoulder - Pt reports quality as heaviness. SOB reported. Pt denies nausea and vomiting. No diaphoresis noted. Skin color pale. Pt reports pain is intermittent and relieved while sitting up.     Awaiting MD orders/disposition. Bed rails up x 2 with bed locked in lowest position. Call light in reach. ED eval continues. Pt verbalizes understanding of plan of care.

## 2022-01-30 NOTE — ASSESSMENT & PLAN NOTE
61 yr old with HCM basal septal area came today for NSTEMI.  Troponin 0.9 -> 0.7.  Serial EKG/ Serial Troponin I.  Formal TTE.  Start with ACS protocol with heparin gtt.  Asprin 325 mg loading dose and continue with Asprin 81 mg daily.  Plavix 600 mg once and start with plavix 75 mg daily.   Statin and LDL level.  Avoid tachycardia, may be contributing to symptoms. Resume BB.  Avoid inotropic agents.  IC consult.  D/W Dr. Hidalgo.  Please call if any questions or concerns.  Cardiology will follow in am.

## 2022-01-30 NOTE — CONSULTS
[facility] - Emergency Dept  Cardiology  Consult Note    Patient Name: Fabio Bhardwaj  MRN: [id number]  Admission Date: 1/29/2022  Hospital Length of Stay: 0 days  Code Status: No Order   Attending Provider: J Carlos Moscoso MD   Consulting Provider: May Tomas MD  Primary Care Physician: Candido Ferrara MD  Principal Problem:<principal problem not specified>    Patient information was obtained from patient and ER records.     Inpatient consult to Cardiology  Consult performed by: May Tomas MD  Consult ordered by: Ronni King MD        Subjective:     Chief Complaint:  Chest pain.     HPI:   61 yr old male with known HCM, HTN, GERALD came today for evaluation of elevated d- dimer, concern with associated rt sided pleuritic chest pain. PCP recommended to come to ED.   Came today for evaluation of PE. CT negative for PE. Troponin mildly elevated. Admitted to . Chest pain right sided with breathing above the RUQ on the lateral aspect. No symptoms with exertion. Walks 2-3 miles daily. Yesterday some SOB after walking 1.5 miles so slowed down. No orthopnea, PND, leg swelling, palpitations, syncope, left sided chest pain/ pressure/ tightness.     BNP 72, Troponin I 0.092 -> 0.07. D-dimer 0.99, Covid negative.  HR 77- 94, .    EKG with NSR, first degree AV block, IVCD, LVH, LAE (similar to prior).    CT: No evidence of pulmonary arterial thromboembolism.Thickened interventricular septum.  Consider correlation with echocardiography. Right medial lung base small mild ground-glass and consolidative opacification which may represent infectious or noninfectious inflammatory process.  There is adjacent triangular-shaped and bandlike atelectasis.    4/15/2021: The stress echo portion of this study is negative for myocardial ischemia.The ECG portion of this study is negative for myocardial ischemia, Left anterior fascicular block prsent. The left ventricle is normal in size with moderate predominantly basal septal  moderate asymmetric hypertrophy eccentric hypertrophy and normal systolic function without obstruction with valsalva. The estimated resting ejection fraction is 60%.The test was stopped because the patient experienced fatigue. During stress, the following significant arrhythmias were observed: rare PVCs. Mild aortic regurgitation confirmed by Doppler.    3/2021: Moderate predominantly mid septal asymmetric hypertrophy and normal systolic function. The estimated ejection fraction is 55-60%. Asymetric hypertophic cardiomyopathy. No LVOT obstruction noted however valsalva not performed. Normal right ventricular size with normal right ventricular systolic function. Severe left atrial enlargement. Indeterminate left ventricular diastolic function. Moderate aortic regurgitation. Normal central venous pressure (3 mmHg). The estimated PA systolic pressure is 21 mmHg.        Past Medical History:   Diagnosis Date    Osteoporosis        Past Surgical History:   Procedure Laterality Date    arthroscopic      EYE SURGERY      JOINT REPLACEMENT         Review of patient's allergies indicates:   Allergen Reactions    Amoxicillin-pot clavulanate      Other reaction(s): Nausea    Azithromycin        No current facility-administered medications on file prior to encounter.     Current Outpatient Medications on File Prior to Encounter   Medication Sig    allopurinol (ZYLOPRIM) 300 MG tablet Take 1 tablet (300 mg total) by mouth once daily. (Patient not taking: Reported on 3/10/2021)    amLODIPine (NORVASC) 10 MG tablet Take 1 tablet (10 mg total) by mouth once daily.    azelastine (ASTELIN) 137 mcg (0.1 %) nasal spray 1 spray (137 mcg total) by Nasal route 2 (two) times daily.    diclofenac (VOLTAREN) 75 MG EC tablet Take 75 mg by mouth 2 (two) times daily.    promethazine-codeine 6.25-10 mg/5 ml (PHENERGAN WITH CODEINE) 6.25-10 mg/5 mL syrup Take 5 mLs by mouth every 4 (four) hours as needed for Cough. (Patient not taking:  Reported on 3/10/2021)    valsartan (DIOVAN) 80 MG tablet Take 1 tablet (80 mg total) by mouth once daily.     Family History     Problem Relation (Age of Onset)    Gout Sister, Maternal Grandfather    Stroke Father        Tobacco Use    Smoking status: Former Smoker     Packs/day: 0.80     Years: 12.00     Pack years: 9.60     Types: Cigarettes     Quit date: 2000     Years since quittin.1    Smokeless tobacco: Not on file   Substance and Sexual Activity    Alcohol use: No    Drug use: No    Sexual activity: Not on file     ROS  Objective:     Vital Signs (Most Recent):  Temp: 98.8 °F (37.1 °C) (22)  Pulse: 77 (22)  Resp: (!) 22 (22)  BP: (!) 152/70 (22)  SpO2: (!) 94 % (22) Vital Signs (24h Range):  Temp:  [98.6 °F (37 °C)-98.9 °F (37.2 °C)] 98.8 °F (37.1 °C)  Pulse:  [77-94] 77  Resp:  [18-24] 22  SpO2:  [94 %-96 %] 94 %  BP: (151-159)/(70-74) 152/70     Weight: 102.1 kg (225 lb)  Body mass index is 33.23 kg/m².    SpO2: (!) 94 %  O2 Device (Oxygen Therapy): room air    No intake or output data in the 24 hours ending 22 2307    Lines/Drains/Airways     Peripheral Intravenous Line                 Peripheral IV - Single Lumen 22 1915 20 G Right Antecubital <1 day                Physical Exam  Vitals and nursing note reviewed.   Constitutional:       Appearance: He is well-developed and well-nourished.   HENT:      Head: Normocephalic and atraumatic.      Mouth/Throat:      Mouth: Mucous membranes are moist.   Eyes:      Extraocular Movements: EOM normal.      Pupils: Pupils are equal, round, and reactive to light.   Cardiovascular:      Rate and Rhythm: Normal rate and regular rhythm.      Pulses: Normal pulses and intact distal pulses.      Heart sounds: No murmur heard.      Pulmonary:      Effort: Pulmonary effort is normal. No respiratory distress.      Breath sounds: Normal breath sounds. No rales.   Abdominal:      General:  Bowel sounds are normal. There is no distension.      Palpations: Abdomen is soft.      Tenderness: There is no abdominal tenderness. There is no guarding.   Musculoskeletal:         General: No edema.      Cervical back: Normal range of motion.      Right lower leg: No edema.      Left lower leg: No edema.   Skin:     General: Skin is warm.   Neurological:      General: No focal deficit present.      Mental Status: He is alert and oriented to person, place, and time.   Psychiatric:         Mood and Affect: Mood and affect and mood normal.         Significant Labs:   CMP   Recent Labs   Lab 01/29/22 1919      K 4.1      CO2 25   GLU 99   BUN 18   CREATININE 1.0   CALCIUM 9.7   PROT 7.5   ALBUMIN 3.4*   BILITOT 0.8   ALKPHOS 49*   AST 19   ALT 22   ANIONGAP 11   ESTGFRAFRICA >60.0   EGFRNONAA >60.0   , CBC   Recent Labs   Lab 01/28/22  1649 01/28/22  1649 01/29/22 1919   WBC 9.11  --  7.68   HGB 15.1  --  14.6   HCT 47.8   < > 45.2     --  213    < > = values in this interval not displayed.    and INR No results for input(s): INR, PROTIME in the last 48 hours.    Significant Imaging: Echocardiogram: 2D echo with color flow doppler: No results found for this or any previous visit.    Assessment and Plan:     Troponin level elevated  61 yr old with HCM basal septal area came today for right sided chest pain. Un;ile;y ACS, no chest pain currently.   Troponin 0.09 -> 0.07.  Serial EKG/serial troponin.  Monitor overnight.  Statin and LDL level.  Avoid tachycardia in setting of HCM Consider BB.  Avoid inotropic agents.  D/W Dr. Hidalgo, will review prior TTE prom last year.  Please call if any questions or concerns or change in status..  Cardiology will follow in am.          VTE Risk Mitigation (From admission, onward)    None          Thank you for your consult. I will follow-up with patient. Please contact us if you have any additional questions.    May Tomas MD  Cardiology   [facility] -  Emergency Dept

## 2022-01-30 NOTE — ED NOTES
Pt sitting up in bed with glasses on. Pt denies chest pain/ shortness of breath at this time. Pt resting comfortably. Pt denies complaints at present. Awaiting MD orders/disposition. Bed rails up x 2 with bed locked in lowest position. Call light in reach. ADMIT eval continues.

## 2022-01-30 NOTE — SUBJECTIVE & OBJECTIVE
Past Medical History:   Diagnosis Date    Osteoporosis        Past Surgical History:   Procedure Laterality Date    arthroscopic      EYE SURGERY      JOINT REPLACEMENT         Review of patient's allergies indicates:   Allergen Reactions    Amoxicillin-pot clavulanate      Other reaction(s): Nausea    Azithromycin        No current facility-administered medications on file prior to encounter.     Current Outpatient Medications on File Prior to Encounter   Medication Sig    allopurinol (ZYLOPRIM) 300 MG tablet Take 1 tablet (300 mg total) by mouth once daily. (Patient not taking: Reported on 3/10/2021)    amLODIPine (NORVASC) 10 MG tablet Take 1 tablet (10 mg total) by mouth once daily.    azelastine (ASTELIN) 137 mcg (0.1 %) nasal spray 1 spray (137 mcg total) by Nasal route 2 (two) times daily.    diclofenac (VOLTAREN) 75 MG EC tablet Take 75 mg by mouth 2 (two) times daily.    promethazine-codeine 6.25-10 mg/5 ml (PHENERGAN WITH CODEINE) 6.25-10 mg/5 mL syrup Take 5 mLs by mouth every 4 (four) hours as needed for Cough. (Patient not taking: Reported on 3/10/2021)    valsartan (DIOVAN) 80 MG tablet Take 1 tablet (80 mg total) by mouth once daily.     Family History     Problem Relation (Age of Onset)    Gout Sister, Maternal Grandfather    Stroke Father        Tobacco Use    Smoking status: Former Smoker     Packs/day: 0.80     Years: 12.00     Pack years: 9.60     Types: Cigarettes     Quit date: 2000     Years since quittin.1    Smokeless tobacco: Not on file   Substance and Sexual Activity    Alcohol use: No    Drug use: No    Sexual activity: Not on file     ROS  Objective:     Vital Signs (Most Recent):  Temp: 98.8 °F (37.1 °C) (22)  Pulse: 77 (22)  Resp: (!) 22 (22)  BP: (!) 152/70 (22)  SpO2: (!) 94 % (22) Vital Signs (24h Range):  Temp:  [98.6 °F (37 °C)-98.9 °F (37.2 °C)] 98.8 °F (37.1 °C)  Pulse:  [77-94] 77  Resp:   [18-24] 22  SpO2:  [94 %-96 %] 94 %  BP: (151-159)/(70-74) 152/70     Weight: 102.1 kg (225 lb)  Body mass index is 33.23 kg/m².    SpO2: (!) 94 %  O2 Device (Oxygen Therapy): room air    No intake or output data in the 24 hours ending 01/29/22 2307    Lines/Drains/Airways     Peripheral Intravenous Line                 Peripheral IV - Single Lumen 01/29/22 1915 20 G Right Antecubital <1 day                Physical Exam  Vitals and nursing note reviewed.   Constitutional:       Appearance: He is well-developed and well-nourished.   HENT:      Head: Normocephalic and atraumatic.      Mouth/Throat:      Mouth: Mucous membranes are moist.   Eyes:      Extraocular Movements: EOM normal.      Pupils: Pupils are equal, round, and reactive to light.   Cardiovascular:      Rate and Rhythm: Normal rate and regular rhythm.      Pulses: Normal pulses and intact distal pulses.      Heart sounds: No murmur heard.      Pulmonary:      Effort: Pulmonary effort is normal. No respiratory distress.      Breath sounds: Normal breath sounds. No rales.   Abdominal:      General: Bowel sounds are normal. There is no distension.      Palpations: Abdomen is soft.      Tenderness: There is no abdominal tenderness. There is no guarding.   Musculoskeletal:         General: No edema.      Cervical back: Normal range of motion.      Right lower leg: No edema.      Left lower leg: No edema.   Skin:     General: Skin is warm.   Neurological:      General: No focal deficit present.      Mental Status: He is alert and oriented to person, place, and time.   Psychiatric:         Mood and Affect: Mood and affect and mood normal.         Significant Labs:   CMP   Recent Labs   Lab 01/29/22 1919      K 4.1      CO2 25   GLU 99   BUN 18   CREATININE 1.0   CALCIUM 9.7   PROT 7.5   ALBUMIN 3.4*   BILITOT 0.8   ALKPHOS 49*   AST 19   ALT 22   ANIONGAP 11   ESTGFRAFRICA >60.0   EGFRNONAA >60.0   , CBC   Recent Labs   Lab 01/28/22  1649  01/28/22  1649 01/29/22  1919   WBC 9.11  --  7.68   HGB 15.1  --  14.6   HCT 47.8   < > 45.2     --  213    < > = values in this interval not displayed.    and INR No results for input(s): INR, PROTIME in the last 48 hours.    Significant Imaging: Echocardiogram: 2D echo with color flow doppler: No results found for this or any previous visit.

## 2022-01-30 NOTE — PLAN OF CARE
Assessed patient for admission.  61-year-old male presented with 2 days of pleuritic right-sided chest pain which had made it difficult to sleep.  He had had similar symptoms along with more general flu-like symptoms 3 weeks previously when he assumed he had COVID, when other family members had tested positive for it but never got tested himself.  He isolated for the recommended quarantine period and improved without any treatment.  He had been asymptomatic up until the return of the pleuritic symptoms 2 days previously.  He has a large dog, an [place] and he has continued to be able to do so even since the symptoms returned.  He only walked 1-1/2 miles with his dog today, anticipating going in for medical tests and not wanting to overexert himself.  His PCP had him go get a troponin and D-dimer drawn based on his reported symptoms and after they returned minimally elevated he was advised to go to the ED for further evaluation.  I personally reviewed his CTA showing a wedge-shaped infiltrate in the right lower lobe with a small parapneumonic effusion suggestive of bacterial pneumonia which likely corresponds to his symptoms, as well as accounting for his minimally elevated D-dimer of 0.99.  He additionally had elevated troponins of 0.09 and 0.07, which can likely be accounted for by his underlying history of HOCM.  No prior troponins are available for review however.  I did review an exercise stress test from last year which was negative for ischemia.  When reviewing the results and my interpretation of them with the patient he stated that he would rather not stay in the hospital if it was not necessary.  Considering how well he looks, his continued exercise tolerance, and that he states that he has a lot of physician friends as he is an  who works exclusively with physicians, I think discharging him with oral antibiotics is reasonable and  appropriate.

## 2022-01-30 NOTE — DISCHARGE INSTRUCTIONS
You presented today for chest pain.  You underwent a CT chest which showed infiltrates likely pneumonia in your right lower lung zones.  You have been prescribed doxycycline and cefpodoxime antibiotics.  Take each antibiotic twice daily for 5 days.  Please return to the ED if he have any worsening pain, cough, shortness of breath, or confusion.

## 2022-01-31 ENCOUNTER — TELEPHONE (OUTPATIENT)
Dept: INTERNAL MEDICINE | Facility: CLINIC | Age: 62
End: 2022-01-31
Payer: COMMERCIAL

## 2022-03-16 ENCOUNTER — TELEPHONE (OUTPATIENT)
Dept: CARDIOLOGY | Facility: CLINIC | Age: 62
End: 2022-03-16
Payer: COMMERCIAL

## 2022-03-16 NOTE — TELEPHONE ENCOUNTER
Reached out in regard to having clearance form faxed to our office for pending procedure.    Detailed message left on Nicole's line

## 2022-03-18 ENCOUNTER — TELEPHONE (OUTPATIENT)
Dept: CARDIOLOGY | Facility: CLINIC | Age: 62
End: 2022-03-18
Payer: COMMERCIAL

## 2022-03-18 NOTE — TELEPHONE ENCOUNTER
Dr Batres's progress note providing clearance faxed as requested to Dr Shetty's office at [phone]

## 2023-08-30 DIAGNOSIS — I10 ESSENTIAL HYPERTENSION: ICD-10-CM

## 2023-08-30 RX ORDER — AMLODIPINE BESYLATE 10 MG/1
10 TABLET ORAL DAILY
Qty: 90 TABLET | Refills: 4 | Status: SHIPPED | OUTPATIENT
Start: 2023-08-30

## 2023-09-11 DIAGNOSIS — I10 ESSENTIAL HYPERTENSION: ICD-10-CM

## 2023-09-11 RX ORDER — VALSARTAN 80 MG/1
80 TABLET ORAL DAILY
Qty: 90 TABLET | Refills: 4 | Status: SHIPPED | OUTPATIENT
Start: 2023-09-11

## 2023-09-11 RX ORDER — VALSARTAN 80 MG/1
80 TABLET ORAL DAILY
Qty: 90 TABLET | Refills: 4 | Status: SHIPPED | OUTPATIENT
Start: 2023-09-11 | End: 2023-09-11 | Stop reason: SDUPTHER

## 2024-10-07 DIAGNOSIS — I10 ESSENTIAL HYPERTENSION: ICD-10-CM

## 2024-10-07 RX ORDER — AMLODIPINE BESYLATE 10 MG/1
10 TABLET ORAL DAILY
Qty: 90 TABLET | Refills: 4 | Status: SHIPPED | OUTPATIENT
Start: 2024-10-07 | End: 2024-10-07 | Stop reason: SDUPTHER

## 2024-10-07 RX ORDER — AMLODIPINE BESYLATE 10 MG/1
10 TABLET ORAL DAILY
Qty: 90 TABLET | Refills: 4 | Status: SHIPPED | OUTPATIENT
Start: 2024-10-07

## 2024-10-07 RX ORDER — VALSARTAN 80 MG/1
80 TABLET ORAL DAILY
Qty: 90 TABLET | Refills: 4 | Status: SHIPPED | OUTPATIENT
Start: 2024-10-07

## 2025-03-25 DIAGNOSIS — I42.1 HOCM (HYPERTROPHIC OBSTRUCTIVE CARDIOMYOPATHY): Primary | ICD-10-CM

## 2025-04-21 ENCOUNTER — HOSPITAL ENCOUNTER (OUTPATIENT)
Dept: CARDIOLOGY | Facility: HOSPITAL | Age: 65
Discharge: HOME | End: 2025-04-21
Attending: INTERNAL MEDICINE
Payer: MEDICARE

## 2025-04-21 ENCOUNTER — PATIENT MESSAGE (OUTPATIENT)
Dept: CARDIOLOGY | Facility: CLINIC | Age: 65
End: 2025-04-21
Payer: MEDICARE

## 2025-04-21 VITALS
SYSTOLIC BLOOD PRESSURE: 140 MMHG | WEIGHT: 225.06 LBS | DIASTOLIC BLOOD PRESSURE: 67 MMHG | HEART RATE: 69 BPM | HEIGHT: 69 IN | BODY MASS INDEX: 33.34 KG/M2

## 2025-04-21 DIAGNOSIS — I42.1 HOCM (HYPERTROPHIC OBSTRUCTIVE CARDIOMYOPATHY): ICD-10-CM

## 2025-04-21 LAB
ASCENDING AORTA: 3.49 CM
AV AREA BY CONTINUOUS VTI: 4.1 CM2
AV INDEX (PROSTH): 0.87
AV LVOT MEAN GRADIENT: 3 MMHG
AV LVOT PEAK GRADIENT: 5 MMHG
AV MEAN GRADIENT: 5 MMHG
AV PEAK GRADIENT: 8 MMHG
AV REGURGITATION PRESSURE HALF TIME: 379 MS
AV VALVE AREA BY VELOCITY RATIO: 3.6 CM²
AV VALVE AREA: 4 CM2
AV VELOCITY RATIO: 0.79
BSA FOR ECHO PROCEDURE: 2.23 M2
CV ECHO LV RWT: 0.3 CM
DOP CALC AO PEAK VEL: 1.4 M/S
DOP CALC AO VTI: 27.1 CM
DOP CALC LVOT AREA: 4.5 CM2
DOP CALC LVOT DIAMETER: 2.4 CM
DOP CALC LVOT PEAK VEL: 1.1 M/S
DOP CALC LVOT STROKE VOLUME: 107.2 CM3
DOP CALCLVOT PEAK VEL VTI: 23.7 CM
E WAVE DECELERATION TIME: 188 MS
E/A RATIO: 1.21
E/E' RATIO: 6 M/S
ECHO EF ESTIMATED: 57 %
ECHO LV POSTERIOR WALL: 0.8 CM (ref 0.6–1.1)
FRACTIONAL SHORTENING: 30.2 % (ref 28–44)
INTERVENTRICULAR SEPTUM: 1.6 CM (ref 0.6–1.1)
IVC DIAMETER: 2.49 CM
LA MAJOR: 6.3 CM
LA MINOR: 6.6 CM
LA WIDTH: 5.1 CM
LEFT ATRIUM SIZE: 4.5 CM
LEFT ATRIUM VOLUME INDEX MOD: 49 ML/M2
LEFT ATRIUM VOLUME INDEX: 58 ML/M2
LEFT ATRIUM VOLUME MOD: 106 ML
LEFT ATRIUM VOLUME: 126 CM3
LEFT INTERNAL DIMENSION IN SYSTOLE: 3.7 CM (ref 2.1–4)
LEFT VENTRICLE DIASTOLIC VOLUME INDEX: 61.29 ML/M2
LEFT VENTRICLE DIASTOLIC VOLUME: 133 ML
LEFT VENTRICLE MASS INDEX: 118.2 G/M2
LEFT VENTRICLE SYSTOLIC VOLUME INDEX: 26.3 ML/M2
LEFT VENTRICLE SYSTOLIC VOLUME: 57 ML
LEFT VENTRICULAR INTERNAL DIMENSION IN DIASTOLE: 5.3 CM (ref 3.5–6)
LEFT VENTRICULAR MASS: 256.6 G
LV LATERAL E/E' RATIO: 5.3
LV SEPTAL E/E' RATIO: 8.3
MV A" WAVE DURATION": 134.16 MS
MV PEAK A VEL: 0.48 M/S
MV PEAK E VEL: 0.58 M/S
OHS CV RV/LV RATIO: 0.89 CM
PULM VEIN A" WAVE DURATION": 134.16 MS
PULM VEIN S/D RATIO: 1.61
PULMONIC VEIN PEAK A VELOCITY: 0.4 M/S
PV PEAK D VEL: 0.28 M/S
PV PEAK S VEL: 0.45 M/S
RA MAJOR: 5.64 CM
RA PRESSURE ESTIMATED: 8 MMHG
RA WIDTH: 3.8 CM
RIGHT ATRIAL AREA: 18.1 CM2
RIGHT VENTRICLE DIASTOLIC BASEL DIMENSION: 4.7 CM
RV TISSUE DOPPLER FREE WALL SYSTOLIC VELOCITY 1 (APICAL 4 CHAMBER VIEW): 19.66 CM/S
SINUS: 3.47 CM
STJ: 2.96 CM
TDI LATERAL: 0.11 M/S
TDI SEPTAL: 0.07 M/S
TDI: 0.09 M/S
TRICUSPID ANNULAR PLANE SYSTOLIC EXCURSION: 3.57 CM
Z-SCORE OF LEFT VENTRICULAR DIMENSION IN END DIASTOLE: -3.04
Z-SCORE OF LEFT VENTRICULAR DIMENSION IN END SYSTOLE: -1.32

## 2025-04-21 PROCEDURE — 93306 TTE W/DOPPLER COMPLETE: CPT

## 2025-04-21 PROCEDURE — 93306 TTE W/DOPPLER COMPLETE: CPT | Mod: 26,,, | Performed by: INTERNAL MEDICINE

## 2025-05-21 DIAGNOSIS — I10 ESSENTIAL HYPERTENSION: Primary | ICD-10-CM

## 2025-05-23 ENCOUNTER — OFFICE VISIT (OUTPATIENT)
Dept: CARDIOLOGY | Facility: CLINIC | Age: 65
End: 2025-05-23
Payer: MEDICARE

## 2025-05-23 ENCOUNTER — TELEPHONE (OUTPATIENT)
Dept: CARDIOLOGY | Facility: CLINIC | Age: 65
End: 2025-05-23

## 2025-05-23 ENCOUNTER — HOSPITAL ENCOUNTER (OUTPATIENT)
Dept: CARDIOLOGY | Facility: CLINIC | Age: 65
Discharge: HOME | End: 2025-05-23
Payer: MEDICARE

## 2025-05-23 VITALS
SYSTOLIC BLOOD PRESSURE: 148 MMHG | BODY MASS INDEX: 31.81 KG/M2 | RESPIRATION RATE: 16 BRPM | WEIGHT: 214.75 LBS | HEART RATE: 72 BPM | HEIGHT: 69 IN | OXYGEN SATURATION: 98 % | DIASTOLIC BLOOD PRESSURE: 68 MMHG

## 2025-05-23 DIAGNOSIS — I42.2 HYPERTROPHIC CARDIOMYOPATHY: ICD-10-CM

## 2025-05-23 DIAGNOSIS — I10 ESSENTIAL HYPERTENSION: ICD-10-CM

## 2025-05-23 DIAGNOSIS — Z01.818 PREOPERATIVE CLEARANCE: Primary | ICD-10-CM

## 2025-05-23 DIAGNOSIS — R94.31 ABNORMAL ELECTROCARDIOGRAM (ECG) (EKG): ICD-10-CM

## 2025-05-23 LAB
OHS QRS DURATION: 162 MS
OHS QTC CALCULATION: 416 MS

## 2025-05-23 PROCEDURE — 93005 ELECTROCARDIOGRAM TRACING: CPT | Mod: PBBFAC | Performed by: INTERNAL MEDICINE

## 2025-05-23 PROCEDURE — 93010 ELECTROCARDIOGRAM REPORT: CPT | Mod: S$PBB,,, | Performed by: INTERNAL MEDICINE

## 2025-05-23 PROCEDURE — 99213 OFFICE O/P EST LOW 20 MIN: CPT | Mod: PBBFAC,25 | Performed by: INTERNAL MEDICINE

## 2025-05-23 PROCEDURE — 99999 PR PBB SHADOW E&M-EST. PATIENT-LVL III: CPT | Mod: PBBFAC,,, | Performed by: INTERNAL MEDICINE

## 2025-05-23 RX ORDER — VALSARTAN 160 MG/1
160 TABLET ORAL DAILY
Qty: 90 TABLET | Refills: 3 | Status: SHIPPED | OUTPATIENT
Start: 2025-05-23

## 2025-05-23 NOTE — PROGRESS NOTES
[facility]     Subjective:    Patient ID:  Fabio Bhardwaj is a 64 y.o. male who presents for follow-up of Hypertension, Cardiomyopathy, and Abnormal ECG    Review of patient's allergies indicates:   Allergen Reactions    Amoxicillin-pot clavulanate      Other reaction(s): Nausea    Azithromycin      He was diagnosed with hypertrophic cardiomyopathy by echocardiography in 2021.  He has had a negative stress echo in the past.  A repeat echo was done this year showing no worsening of septal thickening mid ventricular position.  His ejection fraction was normal.  There is chordal [place].  No obstruction documented.    He has longstanding hypertension.  He sees 130s at home.  He has been losing weight with Zepbound and exercise.  He takes valsartan 80 mg amlodipine 10 mg for hypertension.  He has lost about 25 lb.    He has shoulder surgery planned in the near future.  Today's electrocardiogram shows heart rate 61 with IVCD and marked left axis deviation.  Nonspecific ST changes noted.  Findings support likely hemiblock.         Review of Systems   Constitutional: Positive for weight loss. Negative for chills, decreased appetite, diaphoresis, fever, malaise/fatigue and night sweats.   HENT:  Negative for congestion, ear discharge, ear pain, hearing loss, hoarse voice, nosebleeds, odynophagia, sore throat, stridor and tinnitus.    Eyes:  Negative for blurred vision, discharge, double vision, pain, photophobia, redness, vision loss in left eye, vision loss in right eye, visual disturbance and visual halos.   Cardiovascular:  Negative for chest pain, claudication, cyanosis, dyspnea on exertion, irregular heartbeat, leg swelling, near-syncope, orthopnea, palpitations, paroxysmal nocturnal dyspnea and syncope.   Respiratory:  Negative for cough, hemoptysis, shortness of breath, sleep disturbances due to breathing, snoring, sputum production and  "wheezing.    Endocrine: Negative for cold intolerance, heat intolerance, polydipsia, polyphagia and polyuria.   Hematologic/Lymphatic: Negative for adenopathy and bleeding problem. Does not bruise/bleed easily.   Skin:  Negative for color change, dry skin, flushing, itching, nail changes, poor wound healing, rash, skin cancer, suspicious lesions and unusual hair distribution.   Musculoskeletal:  Positive for joint pain. Negative for arthritis, back pain, falls, gout, joint swelling, muscle cramps, muscle weakness, myalgias, neck pain and stiffness.   Gastrointestinal:  Negative for bloating, abdominal pain, anorexia, change in bowel habit, bowel incontinence, constipation, diarrhea, dysphagia, excessive appetite, flatus, heartburn, hematemesis, hematochezia, hemorrhoids, jaundice, melena, nausea and vomiting.   Genitourinary:  Negative for bladder incontinence, decreased libido, dysuria, flank pain, frequency, genital sores, hematuria, hesitancy, incomplete emptying, nocturia and urgency.   Neurological:  Negative for aphonia, brief paralysis, difficulty with concentration, disturbances in coordination, excessive daytime sleepiness, dizziness, focal weakness, headaches, light-headedness, loss of balance, numbness, paresthesias, seizures, sensory change, tremors, vertigo and weakness.   Psychiatric/Behavioral:  Negative for altered mental status, depression, hallucinations, memory loss, substance abuse, suicidal ideas and thoughts of violence. The patient does not have insomnia and is not nervous/anxious.    Allergic/Immunologic: Negative for hives and persistent infections.        Objective:       Vitals:    05/23/25 0952   BP: (!) 148/68   BP Location: Left arm   Patient Position: Sitting   Pulse: 72   Resp: 16   SpO2: 98%   Weight: 97.4 kg (214 lb 11.7 oz)   Height: 5' 9" (1.753 m)    Physical Exam  Constitutional:       General: He is not in acute distress.     Appearance: He is well-developed. He is not " diaphoretic.   HENT:      Head: Normocephalic and atraumatic.      Nose: Nose normal.   Eyes:      General: No scleral icterus.        Right eye: No discharge.      Conjunctiva/sclera: Conjunctivae normal.      Pupils: Pupils are equal, round, and reactive to light.   Neck:      Thyroid: No thyromegaly.      Vascular: No JVD.      Trachea: No tracheal deviation.   Cardiovascular:      Rate and Rhythm: Normal rate and regular rhythm.      Pulses:           Carotid pulses are 2+ on the right side and 2+ on the left side.       Radial pulses are 2+ on the right side and 2+ on the left side.        Dorsalis pedis pulses are 2+ on the right side and 2+ on the left side.        Posterior tibial pulses are 2+ on the right side and 2+ on the left side.      Heart sounds: Normal heart sounds. No murmur heard.     No friction rub. No gallop.   Pulmonary:      Effort: Pulmonary effort is normal. No respiratory distress.      Breath sounds: Normal breath sounds. No stridor. No wheezing or rales.   Chest:      Chest wall: No tenderness.   Abdominal:      General: Bowel sounds are normal. There is no distension.      Palpations: Abdomen is soft. There is no mass.      Tenderness: There is no abdominal tenderness. There is no guarding or rebound.   Musculoskeletal:         General: No tenderness. Normal range of motion.      Cervical back: Normal range of motion and neck supple.   Lymphadenopathy:      Cervical: No cervical adenopathy.   Skin:     General: Skin is warm and dry.      Coloration: Skin is not pale.      Findings: No erythema or rash.   Neurological:      Mental Status: He is alert and oriented to person, place, and time.      Cranial Nerves: No cranial nerve deficit.      Coordination: Coordination normal.   Psychiatric:         Behavior: Behavior normal.         Thought Content: Thought content normal.         Judgment: Judgment normal.           Assessment:       1. Preoperative clearance    2. Essential  "hypertension    3. Abnormal electrocardiogram (ECG) (EKG)    4. Hypertrophic cardiomyopathy      Results for orders placed or performed during the hospital encounter of 05/23/25   SCHEDULED EKG 12-LEAD (to Muse)    Collection Time: 05/23/25  9:35 AM   Result Value Ref Range    QRS Duration 162 ms    [facility] QTC Calculation 416 ms       Current Medications[1]     Lab Results   Component Value Date    WBC 7.68 01/29/2022    RBC 4.94 01/29/2022    HGB 14.6 01/29/2022    HCT 45.2 01/29/2022    MCV 92 01/29/2022    MCH 29.6 01/29/2022    MCHC 32.3 01/29/2022    RDW 13.0 01/29/2022     01/29/2022    MPV 10.3 01/29/2022    GRAN 4.7 01/29/2022    GRAN 61.4 01/29/2022    LYMPH 2.3 01/29/2022    LYMPH 30.5 01/29/2022    MONO 0.5 01/29/2022    MONO 6.3 01/29/2022    EOS 0.1 01/29/2022    BASO 0.01 01/29/2022    EOSINOPHIL 1.4 01/29/2022    BASOPHIL 0.1 01/29/2022        CMP  Lab Results   Component Value Date     02/26/2025    K 5.2 02/26/2025     01/29/2022    CO2 30 02/26/2025     (H) 02/26/2025    BUN 19 02/26/2025    CREATININE 0.96 02/26/2025    CALCIUM 9.7 02/26/2025    PROT 7.5 01/29/2022    ALBUMIN 4.2 02/26/2025    BILITOT 0.9 02/26/2025    ALKPHOS 51 02/26/2025    AST 18 02/26/2025    ALT 13 02/26/2025    ANIONGAP 11 01/29/2022    ESTGFRAFRICA >60.0 01/29/2022    EGFRNONAA >60.0 01/29/2022        No results found for: "LABBLOO", "LABURIN", "RESPIRATORYC", "GSRESP"         Results for orders placed or performed during the hospital encounter of 05/23/25   SCHEDULED EKG 12-LEAD (to Muse)    Collection Time: 05/23/25  9:35 AM   Result Value Ref Range    QRS Duration 162 ms    [facility] QTC Calculation 416 ms    Narrative    Test Reason : I10,    Vent. Rate :  61 BPM     Atrial Rate :  61 BPM     P-R Int : 216 ms          QRS Dur : 162 ms      QT Int : 414 ms       P-R-T Axes :  67 -63 102 degrees    QTcB Int : 416 ms    Sinus rhythm with 1st degree A-V block  Left axis deviation  Nonspecific " intraventricular block  Minimal voltage criteria for LVH, may be normal variant ( [place] product )  Abnormal ECG  When compared with ECG of 29-Jan-2022 18:31,  T wave inversion more evident in Lateral leads  QT has shortened  Confirmed by Luis A Prakash ([id number]) on 5/23/2025 10:01:22 AM    Referred By: TABITHA LEE           Confirmed By: Luis A Prakash                   Plan:       Problem List Items Addressed This Visit          Cardiac/Vascular    Essential hypertension    I increase Diovan today.  Amlodipine to continue.  Blood pressures at home 130 systolic range currently.         Relevant Medications    valsartan (DIOVAN) 160 MG tablet    Abnormal electrocardiogram (ECG) (EKG)    Conduction disease identified.  His Electrocardiogram shows no new changes from previous.  Condition stable.         Hypertrophic cardiomyopathy    Condition unchanged.  EF still normal on echo.  He is asymptomatic.            Other    Preoperative clearance - Primary    Clearance given cardiac-wise for upcoming shoulder surgery.               I increase Diovan to 160 mg for better blood pressure control.    The patient is cleared for upcoming shoulder surgery.    One year follow-up recommended.  We discussed his echo findings.  There has been no change in his echo from 2021 to 2025.    He continues to report normal exercise tolerance.  My conclusion is that he has nonobstructive hypertrophic cardiomyopathy without obvious progression since 2021.             Tabitha Lee MD  05/23/2025   10:22 AM               [1]   Current Outpatient Medications:     amLODIPine (NORVASC) 10 MG tablet, Take 1 tablet (10 mg total) by mouth once daily., Disp: 90 tablet, Rfl: 4    tirzepatide, weight loss, (ZEPBOUND) 2.5 mg/0.5 mL PnIj, Inject 2.5 mg into the skin every 7 (seven) days (Patient not taking: Reported on 5/23/2025), Disp: 2 mL, Rfl: 1    valsartan (DIOVAN) 160 MG tablet, Take 1 tablet (160 mg total) by mouth once daily., Disp: 90 tablet,  Rfl: 3

## 2025-05-23 NOTE — ASSESSMENT & PLAN NOTE
Clearance given cardiac-wise for upcoming shoulder surgery.  
Condition unchanged.  EF still normal on echo.  He is asymptomatic.  
Conduction disease identified.  His Electrocardiogram shows no new changes from previous.  Condition stable.  
I increase Diovan today.  Amlodipine to continue.  Blood pressures at home 130 systolic range currently.  
no abdominal pain, no bloating, no constipation, no diarrhea, no nausea and no vomiting.